# Patient Record
Sex: MALE | Race: WHITE | NOT HISPANIC OR LATINO | Employment: FULL TIME | URBAN - METROPOLITAN AREA
[De-identification: names, ages, dates, MRNs, and addresses within clinical notes are randomized per-mention and may not be internally consistent; named-entity substitution may affect disease eponyms.]

---

## 2023-09-29 ENCOUNTER — LAB (OUTPATIENT)
Dept: LAB | Facility: LAB | Age: 28
End: 2023-09-29
Payer: COMMERCIAL

## 2023-09-29 LAB — TOBACCO SCREEN, URINE: NEGATIVE

## 2024-04-29 ENCOUNTER — ANESTHESIA EVENT (OUTPATIENT)
Dept: OPERATING ROOM | Facility: HOSPITAL | Age: 29
End: 2024-04-29
Payer: COMMERCIAL

## 2024-04-29 ENCOUNTER — OFFICE VISIT (OUTPATIENT)
Dept: ORTHOPEDIC SURGERY | Facility: CLINIC | Age: 29
End: 2024-04-29
Payer: COMMERCIAL

## 2024-04-29 VITALS — WEIGHT: 180 LBS | BODY MASS INDEX: 24.38 KG/M2 | HEIGHT: 72 IN

## 2024-04-29 DIAGNOSIS — S62.012A CLOSED DISPLACED FRACTURE OF DISTAL POLE OF SCAPHOID BONE OF LEFT WRIST, INITIAL ENCOUNTER: ICD-10-CM

## 2024-04-29 DIAGNOSIS — S62.102A WRIST FRACTURE, CLOSED, LEFT, INITIAL ENCOUNTER: Primary | ICD-10-CM

## 2024-04-29 DIAGNOSIS — S63.501A RIGHT WRIST SPRAIN, INITIAL ENCOUNTER: ICD-10-CM

## 2024-04-29 PROCEDURE — 99204 OFFICE O/P NEW MOD 45 MIN: CPT | Performed by: ORTHOPAEDIC SURGERY

## 2024-04-29 PROCEDURE — 1036F TOBACCO NON-USER: CPT | Performed by: ORTHOPAEDIC SURGERY

## 2024-04-29 ASSESSMENT — PAIN - FUNCTIONAL ASSESSMENT: PAIN_FUNCTIONAL_ASSESSMENT: 0-10

## 2024-04-29 ASSESSMENT — PAIN SCALES - GENERAL: PAINLEVEL_OUTOF10: 5 - MODERATE PAIN
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
## 2024-04-30 ENCOUNTER — APPOINTMENT (OUTPATIENT)
Dept: SPORTS MEDICINE | Facility: HOSPITAL | Age: 29
End: 2024-04-30
Payer: COMMERCIAL

## 2024-05-01 ENCOUNTER — APPOINTMENT (OUTPATIENT)
Dept: RADIOLOGY | Facility: HOSPITAL | Age: 29
End: 2024-05-01
Payer: COMMERCIAL

## 2024-05-01 ENCOUNTER — HOSPITAL ENCOUNTER (OUTPATIENT)
Facility: HOSPITAL | Age: 29
Setting detail: OUTPATIENT SURGERY
Discharge: HOME | End: 2024-05-01
Attending: ORTHOPAEDIC SURGERY | Admitting: ORTHOPAEDIC SURGERY
Payer: COMMERCIAL

## 2024-05-01 ENCOUNTER — ANESTHESIA (OUTPATIENT)
Dept: OPERATING ROOM | Facility: HOSPITAL | Age: 29
End: 2024-05-01
Payer: COMMERCIAL

## 2024-05-01 ENCOUNTER — PHARMACY VISIT (OUTPATIENT)
Dept: PHARMACY | Facility: CLINIC | Age: 29
End: 2024-05-01
Payer: COMMERCIAL

## 2024-05-01 VITALS
TEMPERATURE: 99.3 F | HEIGHT: 72 IN | SYSTOLIC BLOOD PRESSURE: 136 MMHG | BODY MASS INDEX: 26.01 KG/M2 | OXYGEN SATURATION: 96 % | RESPIRATION RATE: 16 BRPM | HEART RATE: 84 BPM | DIASTOLIC BLOOD PRESSURE: 75 MMHG | WEIGHT: 192.02 LBS

## 2024-05-01 DIAGNOSIS — S62.102A WRIST FRACTURE, CLOSED, LEFT, INITIAL ENCOUNTER: Primary | ICD-10-CM

## 2024-05-01 PROCEDURE — 2780000003 HC OR 278 NO HCPCS: Performed by: ORTHOPAEDIC SURGERY

## 2024-05-01 PROCEDURE — 3700000002 HC GENERAL ANESTHESIA TIME - EACH INCREMENTAL 1 MINUTE: Performed by: ORTHOPAEDIC SURGERY

## 2024-05-01 PROCEDURE — 2500000004 HC RX 250 GENERAL PHARMACY W/ HCPCS (ALT 636 FOR OP/ED): Performed by: ANESTHESIOLOGY

## 2024-05-01 PROCEDURE — 25675 CLTX DSTL RAD/ULN DISLC MNPJ: CPT | Performed by: ORTHOPAEDIC SURGERY

## 2024-05-01 PROCEDURE — 2500000001 HC RX 250 WO HCPCS SELF ADMINISTERED DRUGS (ALT 637 FOR MEDICARE OP): Performed by: ORTHOPAEDIC SURGERY

## 2024-05-01 PROCEDURE — RXMED WILLOW AMBULATORY MEDICATION CHARGE

## 2024-05-01 PROCEDURE — 7100000010 HC PHASE TWO TIME - EACH INCREMENTAL 1 MINUTE: Performed by: ORTHOPAEDIC SURGERY

## 2024-05-01 PROCEDURE — 2500000004 HC RX 250 GENERAL PHARMACY W/ HCPCS (ALT 636 FOR OP/ED): Performed by: NURSE ANESTHETIST, CERTIFIED REGISTERED

## 2024-05-01 PROCEDURE — 25622 CLTX CARPL SCPHD FX W/O MNPJ: CPT | Performed by: ORTHOPAEDIC SURGERY

## 2024-05-01 PROCEDURE — 76000 FLUOROSCOPY <1 HR PHYS/QHP: CPT | Mod: 59

## 2024-05-01 PROCEDURE — A25609 PR OPEN RX DISTAL RADIUS FX, INTRA-ARTICULAR, 3+ FRAG: Performed by: ANESTHESIOLOGY

## 2024-05-01 PROCEDURE — A25609 PR OPEN RX DISTAL RADIUS FX, INTRA-ARTICULAR, 3+ FRAG: Performed by: NURSE ANESTHETIST, CERTIFIED REGISTERED

## 2024-05-01 PROCEDURE — 2720000007 HC OR 272 NO HCPCS: Performed by: ORTHOPAEDIC SURGERY

## 2024-05-01 PROCEDURE — 3600000008 HC OR TIME - EACH INCREMENTAL 1 MINUTE - PROCEDURE LEVEL THREE: Performed by: ORTHOPAEDIC SURGERY

## 2024-05-01 PROCEDURE — 7100000009 HC PHASE TWO TIME - INITIAL BASE CHARGE: Performed by: ORTHOPAEDIC SURGERY

## 2024-05-01 PROCEDURE — 7100000002 HC RECOVERY ROOM TIME - EACH INCREMENTAL 1 MINUTE: Performed by: ORTHOPAEDIC SURGERY

## 2024-05-01 PROCEDURE — 2500000005 HC RX 250 GENERAL PHARMACY W/O HCPCS: Performed by: NURSE ANESTHETIST, CERTIFIED REGISTERED

## 2024-05-01 PROCEDURE — 64450 NJX AA&/STRD OTHER PN/BRANCH: CPT | Performed by: ANESTHESIOLOGY

## 2024-05-01 PROCEDURE — A4217 STERILE WATER/SALINE, 500 ML: HCPCS | Performed by: ORTHOPAEDIC SURGERY

## 2024-05-01 PROCEDURE — 2500000004 HC RX 250 GENERAL PHARMACY W/ HCPCS (ALT 636 FOR OP/ED): Performed by: ORTHOPAEDIC SURGERY

## 2024-05-01 PROCEDURE — C1713 ANCHOR/SCREW BN/BN,TIS/BN: HCPCS | Performed by: ORTHOPAEDIC SURGERY

## 2024-05-01 PROCEDURE — 3700000001 HC GENERAL ANESTHESIA TIME - INITIAL BASE CHARGE: Performed by: ORTHOPAEDIC SURGERY

## 2024-05-01 PROCEDURE — 25609 OPTX DST RD XART FX/EP SEP3+: CPT | Performed by: ORTHOPAEDIC SURGERY

## 2024-05-01 PROCEDURE — 7100000001 HC RECOVERY ROOM TIME - INITIAL BASE CHARGE: Performed by: ORTHOPAEDIC SURGERY

## 2024-05-01 PROCEDURE — 3600000003 HC OR TIME - INITIAL BASE CHARGE - PROCEDURE LEVEL THREE: Performed by: ORTHOPAEDIC SURGERY

## 2024-05-01 DEVICE — PEG, SMOOTH LOCKING, 2.0 X 19MM, TI: Type: IMPLANTABLE DEVICE | Site: WRIST | Status: FUNCTIONAL

## 2024-05-01 DEVICE — IMPLANTABLE DEVICE: Type: IMPLANTABLE DEVICE | Site: WRIST | Status: FUNCTIONAL

## 2024-05-01 DEVICE — PEG, SMOOTH LOCKING, 2.0 X 18MM, TI: Type: IMPLANTABLE DEVICE | Site: WRIST | Status: FUNCTIONAL

## 2024-05-01 DEVICE — K-WIRE, 1.5 X 127MM: Type: IMPLANTABLE DEVICE | Site: WRIST | Status: NON-FUNCTIONAL

## 2024-05-01 RX ORDER — OXYCODONE HYDROCHLORIDE 5 MG/1
5 TABLET ORAL EVERY 6 HOURS PRN
COMMUNITY

## 2024-05-01 RX ORDER — ONDANSETRON HYDROCHLORIDE 2 MG/ML
4 INJECTION, SOLUTION INTRAVENOUS ONCE AS NEEDED
Status: COMPLETED | OUTPATIENT
Start: 2024-05-01 | End: 2024-05-01

## 2024-05-01 RX ORDER — MIDAZOLAM HYDROCHLORIDE 1 MG/ML
INJECTION INTRAMUSCULAR; INTRAVENOUS AS NEEDED
Status: DISCONTINUED | OUTPATIENT
Start: 2024-05-01 | End: 2024-05-01

## 2024-05-01 RX ORDER — CHLORHEXIDINE GLUCONATE 40 MG/ML
SOLUTION TOPICAL AS NEEDED
Status: DISCONTINUED | OUTPATIENT
Start: 2024-05-01 | End: 2024-05-01 | Stop reason: HOSPADM

## 2024-05-01 RX ORDER — LIDOCAINE HCL/PF 100 MG/5ML
SYRINGE (ML) INTRAVENOUS AS NEEDED
Status: DISCONTINUED | OUTPATIENT
Start: 2024-05-01 | End: 2024-05-01

## 2024-05-01 RX ORDER — IBUPROFEN 200 MG
400 TABLET ORAL EVERY 8 HOURS PRN
COMMUNITY

## 2024-05-01 RX ORDER — KETOROLAC TROMETHAMINE 30 MG/ML
INJECTION, SOLUTION INTRAMUSCULAR; INTRAVENOUS AS NEEDED
Status: DISCONTINUED | OUTPATIENT
Start: 2024-05-01 | End: 2024-05-01

## 2024-05-01 RX ORDER — DROPERIDOL 2.5 MG/ML
0.62 INJECTION, SOLUTION INTRAMUSCULAR; INTRAVENOUS ONCE AS NEEDED
Status: DISCONTINUED | OUTPATIENT
Start: 2024-05-01 | End: 2024-05-01 | Stop reason: HOSPADM

## 2024-05-01 RX ORDER — SODIUM CHLORIDE, SODIUM LACTATE, POTASSIUM CHLORIDE, CALCIUM CHLORIDE 600; 310; 30; 20 MG/100ML; MG/100ML; MG/100ML; MG/100ML
100 INJECTION, SOLUTION INTRAVENOUS CONTINUOUS
Status: DISCONTINUED | OUTPATIENT
Start: 2024-05-01 | End: 2024-05-01 | Stop reason: HOSPADM

## 2024-05-01 RX ORDER — PROPOFOL 10 MG/ML
INJECTION, EMULSION INTRAVENOUS AS NEEDED
Status: DISCONTINUED | OUTPATIENT
Start: 2024-05-01 | End: 2024-05-01

## 2024-05-01 RX ORDER — MIDAZOLAM HYDROCHLORIDE 1 MG/ML
INJECTION, SOLUTION INTRAMUSCULAR; INTRAVENOUS AS NEEDED
Status: DISCONTINUED | OUTPATIENT
Start: 2024-05-01 | End: 2024-05-01

## 2024-05-01 RX ORDER — CEFAZOLIN 1 G/1
INJECTION, POWDER, FOR SOLUTION INTRAVENOUS AS NEEDED
Status: DISCONTINUED | OUTPATIENT
Start: 2024-05-01 | End: 2024-05-01

## 2024-05-01 RX ORDER — SODIUM CHLORIDE 0.9 G/100ML
IRRIGANT IRRIGATION AS NEEDED
Status: DISCONTINUED | OUTPATIENT
Start: 2024-05-01 | End: 2024-05-01 | Stop reason: HOSPADM

## 2024-05-01 RX ORDER — OXYCODONE HYDROCHLORIDE 5 MG/1
5 TABLET ORAL EVERY 4 HOURS PRN
Status: DISCONTINUED | OUTPATIENT
Start: 2024-05-01 | End: 2024-05-01 | Stop reason: HOSPADM

## 2024-05-01 RX ORDER — ONDANSETRON HYDROCHLORIDE 2 MG/ML
INJECTION, SOLUTION INTRAVENOUS AS NEEDED
Status: DISCONTINUED | OUTPATIENT
Start: 2024-05-01 | End: 2024-05-01

## 2024-05-01 RX ORDER — ACETAMINOPHEN 500 MG
500 TABLET ORAL EVERY 8 HOURS PRN
COMMUNITY

## 2024-05-01 RX ORDER — OXYCODONE HYDROCHLORIDE 5 MG/1
5 TABLET ORAL EVERY 6 HOURS PRN
Qty: 18 TABLET | Refills: 0 | Status: SHIPPED | OUTPATIENT
Start: 2024-05-01

## 2024-05-01 RX ORDER — FENTANYL CITRATE 50 UG/ML
INJECTION, SOLUTION INTRAMUSCULAR; INTRAVENOUS AS NEEDED
Status: DISCONTINUED | OUTPATIENT
Start: 2024-05-01 | End: 2024-05-01

## 2024-05-01 RX ADMIN — SODIUM CHLORIDE, POTASSIUM CHLORIDE, SODIUM LACTATE AND CALCIUM CHLORIDE 100 ML/HR: 600; 310; 30; 20 INJECTION, SOLUTION INTRAVENOUS at 07:30

## 2024-05-01 RX ADMIN — PROPOFOL 50 MG: 10 INJECTION, EMULSION INTRAVENOUS at 09:50

## 2024-05-01 RX ADMIN — FENTANYL CITRATE 50 MCG: 50 INJECTION, SOLUTION INTRAMUSCULAR; INTRAVENOUS at 09:49

## 2024-05-01 RX ADMIN — LIDOCAINE HYDROCHLORIDE 80 MG: 20 INJECTION INTRAVENOUS at 09:50

## 2024-05-01 RX ADMIN — MIDAZOLAM HYDROCHLORIDE 2 MG: 1 INJECTION, SOLUTION INTRAMUSCULAR; INTRAVENOUS at 09:36

## 2024-05-01 RX ADMIN — MIDAZOLAM 2 MG: 1 INJECTION INTRAMUSCULAR; INTRAVENOUS at 08:05

## 2024-05-01 RX ADMIN — PROPOFOL 200 MG: 10 INJECTION, EMULSION INTRAVENOUS at 09:49

## 2024-05-01 RX ADMIN — ONDANSETRON 4 MG: 2 INJECTION INTRAMUSCULAR; INTRAVENOUS at 09:49

## 2024-05-01 RX ADMIN — KETOROLAC TROMETHAMINE 30 MG: 30 INJECTION, SOLUTION INTRAMUSCULAR at 11:11

## 2024-05-01 RX ADMIN — DEXAMETHASONE SODIUM PHOSPHATE 8 MG: 4 INJECTION INTRA-ARTICULAR; INTRALESIONAL; INTRAMUSCULAR; INTRAVENOUS; SOFT TISSUE at 09:51

## 2024-05-01 RX ADMIN — CEFAZOLIN 2 G: 330 INJECTION, POWDER, FOR SOLUTION INTRAMUSCULAR; INTRAVENOUS at 09:52

## 2024-05-01 RX ADMIN — ONDANSETRON 4 MG: 2 INJECTION INTRAMUSCULAR; INTRAVENOUS at 12:34

## 2024-05-01 RX ADMIN — ONDANSETRON 4 MG: 2 INJECTION INTRAMUSCULAR; INTRAVENOUS at 11:10

## 2024-05-01 SDOH — HEALTH STABILITY: MENTAL HEALTH: CURRENT SMOKER: 0

## 2024-05-01 ASSESSMENT — PAIN - FUNCTIONAL ASSESSMENT: PAIN_FUNCTIONAL_ASSESSMENT: UNABLE TO SELF-REPORT

## 2024-05-01 NOTE — ANESTHESIA PREPROCEDURE EVALUATION
"Patient: Nacho Pendleton    Procedure Information       Date/Time: 05/01/24 0900    Procedure: LEFT WRIST ORIF (Left: Wrist) - with Regional Block    Location: [facility] / [facility]    Surgeons: Alexander Guy MD          Vitals:    05/01/24 0701   BP: (!) 147/99   Pulse: 83   Resp: 16   Temp: 36.9 °C (98.4 °F)   SpO2: 100%       History reviewed. No pertinent surgical history.  Past Medical History:   Diagnosis Date    Other specified health status 06/01/2021    No pertinent past medical history    Wrist fracture, closed, left, initial encounter        Current Facility-Administered Medications:     lactated Ringer's infusion, 100 mL/hr, intravenous, Continuous, Sam Gonzalez MD, Last Rate: 100 mL/hr at 05/01/24 0730, 100 mL/hr at 05/01/24 0730  Prior to Admission medications    Medication Sig Start Date End Date Taking? Authorizing Provider   acetaminophen (Tylenol) 500 mg tablet Take 1 tablet (500 mg) by mouth every 8 hours if needed for mild pain (1 - 3).   Yes Historical Provider, MD   ibuprofen 200 mg tablet Take 2 tablets (400 mg) by mouth every 8 hours if needed for mild pain (1 - 3).   Yes Historical Provider, MD   oxyCODONE (Roxicodone) 5 mg immediate release tablet Take 1 tablet (5 mg) by mouth every 6 hours if needed for severe pain (7 - 10).   Yes Historical Provider, MD   ciprofloxacin (Ciloxan) 0.3 % ophthalmic solution INSTILL 1 DROP IN LEFT EYE EVERY 4 HOURS.  Patient not taking: Reported on 5/1/2024 9/12/23 9/11/24  Stefano Paula MD     Allergies   Allergen Reactions    Nickel Rash     Social History     Tobacco Use    Smoking status: Never    Smokeless tobacco: Never   Substance Use Topics    Alcohol use: Not Currently         Chemistry    No results found for: \"NA\", \"K\", \"CL\", \"CO2\", \"BUN\", \"CREATININE\", \"GLU\" No results found for: \"CALCIUM\", \"ALKPHOS\", \"AST\", \"ALT\", \"BILITOT\"       No results found for: \"WBC\", \"HGB\", \"HCT\", \"PLT\"  No results found for: \"PROTIME\", \"PTT\", \"INR\"  No " results found for this or any previous visit (from the past 4464 hour(s)).  No results found for this or any previous visit from the past 1095 days.    Relevant Problems   No relevant active problems       Clinical information reviewed:   Tobacco  Allergies  Meds   Med Hx  Surg Hx   Fam Hx  Soc Hx        NPO Detail:  NPO/Void Status  Date of Last Liquid: 04/30/24  Time of Last Liquid: 2100  Date of Last Solid: 04/30/24  Time of Last Solid: 1800  Last Intake Type: Clear fluids; Solid meal  Time of Last Void: 0530         Physical Exam    Airway  Mallampati: II     Cardiovascular - normal exam     Dental    Pulmonary    Abdominal            Anesthesia Plan    History of general anesthesia?: yes  History of complications of general anesthesia?: no    ASA 1     general and regional     The patient is not a current smoker.  Patient was not previously instructed to abstain from smoking on day of procedure.  Patient did not smoke on day of procedure.  Education provided regarding risk of obstructive sleep apnea.  intravenous induction   Anesthetic plan and risks discussed with patient.    Plan discussed with CRNA.

## 2024-05-01 NOTE — ANESTHESIA PROCEDURE NOTES
Peripheral Block    Patient location during procedure: pre-op  Reason for block: at surgeon's request and post-op pain management  Staffing  Performed: attending   Authorized by: Sam Gonzalez MD    Performed by: Sam Gonzalez MD  Preanesthetic Checklist  Completed: patient identified, IV checked, site marked, risks and benefits discussed, surgical consent, monitors and equipment checked, pre-op evaluation and timeout performed   Timeout performed at:   Peripheral Block  Patient position: laying flat  Prep: ChloraPrep and site prepped and draped  Patient monitoring: heart rate and continuous pulse ox  Block type: infraclavicular and adductor canal  Laterality: left  Injection technique: single-shot  Guidance: ultrasound guided  Needle  Needle type: short-bevel   Needle gauge: 22 G  Needle length: 5 cm  Needle localization: anatomical landmarks, ultrasound guidance and nerve stimulator  Assessment  Injection assessment: negative aspiration for heme, no paresthesia on injection, incremental injection and local visualized surrounding nerve on ultrasound  Paresthesia pain: none  Heart rate change: no  Slow fractionated injection: yes  Additional Notes  30mL 0.5% marcaine 1:300k epi 4mg decadron
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
## 2024-05-01 NOTE — ANESTHESIA POSTPROCEDURE EVALUATION
Patient: Nacho Pendleton    Procedure Summary       Date: 05/01/24 Room / Location: A OR 02 / [facility]    Anesthesia Start: 0938 Anesthesia Stop: 1150    Procedure: LEFT WRIST ORIF (Left: Wrist) Diagnosis:       Wrist fracture, closed, left, initial encounter      (Wrist fracture, closed, left, initial encounter [S62.102A])    Surgeons: Alexander Guy MD Responsible Provider: Sam Gonzalez MD    Anesthesia Type: general, regional ASA Status: 1            Anesthesia Type: general, regional    Vitals Value Taken Time   /78 05/01/24 1250   Temp  05/01/24 1300   Pulse 81 05/01/24 1255   Resp 17 05/01/24 1205   SpO2 94 % 05/01/24 1255   Vitals shown include unfiled device data.    Anesthesia Post Evaluation    Patient location during evaluation: PACU  Patient participation: complete - patient participated  Level of consciousness: awake  Pain management: adequate  Multimodal analgesia pain management approach  Airway patency: patent  Two or more strategies used to mitigate risk of obstructive sleep apnea  Cardiovascular status: acceptable  Respiratory status: acceptable  Hydration status: acceptable  Postoperative Nausea and Vomiting: none        No notable events documented.

## 2024-05-01 NOTE — DISCHARGE INSTRUCTIONS
Dr. Guy Post-Operative Instructions  Hand/Wrist/Elbow    Activity:  Rest on the day of surgery.  Gradually resume your regular diet, beginning with clear liquids and progressing as you feel ready.  No driving if you had anesthesia.    Anesthesia:  You may feel dizzy, sleepy or lightheaded for up to 24 hours after surgery.  If you had general anesthesia you may have a sore throat for 1-2 days.  If you had a nerve block wear a sling until nerve function returns for your safety.  Nerve block may last 18-36 hours.    Post-Operative Medications:  You may resume your regular medications (including blood thinners if you stopped them).  You have been given a prescription for pain medication as needed.  You may also take over-the-counter anti-inflammatories and/or Tylenol for pain relief.  If you are taking the prescribed pain medication you must limit additional Tylenol (acetaminophen) intake to avoid overdose.    Dressings:  Keep your splint clean and dry.  You may cover with a plastic bag or cast cover and seal bag to your skin above the bandage for showering.  If your dressing becomes wet or significantly bloodstained call the office at [phone].    Post-Operative Care:  Keep the surgical site elevated above the level of your heart to limit swelling.  If your fingers are not included in the dressing you are encouraged to move your fingers regularly (full fist and full extension).  Regularly ice the surgical site.  You may also apply ice pack above the level of the dressing and this will cool the blood as it travels towards the surgical site.    Call Surgeon/Office at any time for:           Office Number: [phone]  Excessive bleeding  Loss of feeling (The local numbing medicine from surgery typically lasts 8-12 hours.  Nerve blocks can last 18-36 hours.)  Tight dressing: Make sure that you are elevating the operative site appropriately.  If no relief then call the office.                                                                                                         Circulation issues:  If fingers change to white or blue  Concerns/Problems with your surgery

## 2024-05-01 NOTE — ANESTHESIA PROCEDURE NOTES
Airway  Date/Time: 5/1/2024 9:51 AM  Urgency: elective    Airway not difficult    Staffing  Performed: CRNA   Authorized by: Sam Gonzalez MD    Performed by: REGINA Du-CRNA  Patient location during procedure: OR    Indications and Patient Condition  Indications for airway management: airway protection and anesthesia  Spontaneous Ventilation: absent  Sedation level: deep  Preoxygenated: yes  Mask difficulty assessment: 1 - vent by mask    Final Airway Details  Final airway type: supraglottic airway      Successful airway: Size 4 (i-Gel)     Number of attempts at approach: 1    Additional Comments  Easy LMA placement.  Teeth and lips in preanesthetic condition.

## 2024-05-13 ENCOUNTER — OFFICE VISIT (OUTPATIENT)
Dept: ORTHOPEDIC SURGERY | Facility: CLINIC | Age: 29
End: 2024-05-13
Payer: COMMERCIAL

## 2024-05-13 ENCOUNTER — HOSPITAL ENCOUNTER (OUTPATIENT)
Dept: RADIOLOGY | Facility: HOSPITAL | Age: 29
Discharge: HOME | End: 2024-05-13
Payer: COMMERCIAL

## 2024-05-13 DIAGNOSIS — S62.012A CLOSED DISPLACED FRACTURE OF DISTAL POLE OF SCAPHOID BONE OF LEFT WRIST, INITIAL ENCOUNTER: ICD-10-CM

## 2024-05-13 PROCEDURE — 99024 POSTOP FOLLOW-UP VISIT: CPT | Performed by: ORTHOPAEDIC SURGERY

## 2024-05-13 PROCEDURE — 73110 X-RAY EXAM OF WRIST: CPT | Mod: LT

## 2024-05-13 PROCEDURE — 73110 X-RAY EXAM OF WRIST: CPT | Mod: LEFT SIDE | Performed by: RADIOLOGY

## 2024-05-13 PROCEDURE — 1036F TOBACCO NON-USER: CPT | Performed by: ORTHOPAEDIC SURGERY

## 2024-05-13 ASSESSMENT — PAIN - FUNCTIONAL ASSESSMENT: PAIN_FUNCTIONAL_ASSESSMENT: NO/DENIES PAIN

## 2024-05-14 NOTE — PROGRESS NOTES
History of Present Illness  Chief Complaint   Patient presents with    Left Wrist - Post-op     5/1/24 left wrist ORIF      Pain controlled.  No numbness or tingling  New radiographs in splint today     Examination  left wrist  Splint is clean, dry and intact.  Forearm sitting in full supination.  EPL and FPL intact.  Sensation grossly intact to light touch throughout fingers.  Some resolving ecchymosis.  Capillary refill less than 2 seconds to all digits.    Radiographs: Left wrist radiographs ordered and available for review demonstrate maintained alignment of distal radius fracture status post ORIF.  Hardware intact.  Maintained positioning of ulnar styloid fragment.     Assessment:  Patient status post left distal radius ORIF with nonoperative treatment of DRUJ instability and long-arm splint with full supination  Current splint overwrapped into long-arm cast remaining in full supination.  He will follow-up in 2 weeks for cast removal and we will obtain new left wrist radiographs at that time.  Plan for likely transitioning to removable brace and begin gradual mobilization at that time.  We once again reviewed potential plan for hardware removal in the future.  He does have scheduled time off in August and this may work well for timing of hardware removal.

## 2024-05-28 ENCOUNTER — OFFICE VISIT (OUTPATIENT)
Dept: ORTHOPEDIC SURGERY | Facility: CLINIC | Age: 29
End: 2024-05-28
Payer: COMMERCIAL

## 2024-05-28 ENCOUNTER — HOSPITAL ENCOUNTER (OUTPATIENT)
Dept: RADIOLOGY | Facility: CLINIC | Age: 29
Discharge: HOME | End: 2024-05-28
Payer: COMMERCIAL

## 2024-05-28 DIAGNOSIS — S62.012A CLOSED DISPLACED FRACTURE OF DISTAL POLE OF SCAPHOID BONE OF LEFT WRIST, INITIAL ENCOUNTER: ICD-10-CM

## 2024-05-28 DIAGNOSIS — S62.102A WRIST FRACTURE, CLOSED, LEFT, INITIAL ENCOUNTER: Primary | ICD-10-CM

## 2024-05-28 PROCEDURE — L3908 WHO COCK-UP NONMOLDE PRE OTS: HCPCS | Performed by: ORTHOPAEDIC SURGERY

## 2024-05-28 PROCEDURE — 73110 X-RAY EXAM OF WRIST: CPT | Mod: LEFT SIDE | Performed by: RADIOLOGY

## 2024-05-28 PROCEDURE — 99024 POSTOP FOLLOW-UP VISIT: CPT | Performed by: ORTHOPAEDIC SURGERY

## 2024-05-28 PROCEDURE — 73110 X-RAY EXAM OF WRIST: CPT | Mod: LT

## 2024-05-28 PROCEDURE — 1036F TOBACCO NON-USER: CPT | Performed by: ORTHOPAEDIC SURGERY

## 2024-05-28 ASSESSMENT — PAIN - FUNCTIONAL ASSESSMENT: PAIN_FUNCTIONAL_ASSESSMENT: NO/DENIES PAIN

## 2024-05-29 NOTE — PROGRESS NOTES
History of Present Illness  Chief Complaint   Patient presents with    Left Wrist - Post-op     5/1/24 left wrist ORIF     No numbness or tingling.  Pain has been relatively well-controlled.  Just obtained new radiographs out of cast.     Examination  left wrist  EPL and FPL intact.  0 cm DPC  Sensation intact throughout distally.  Wrist range of motion not assessed  Capillary refill less than 2 seconds to all digits.    Radiographs: Left wrist radiographs ordered and available for review demonstrate maintained alignment of distal radius fracture status post ORIF.  Hardware intact.  Distal pole of scaphoid fracture also appears to be well-healing and remains well aligned.       Assessment:  Patient status post left distal radius ORIF with nonoperative treatment of DRUJ instability with long-arm immobilization in supination.  Nonoperative treatment distal pole of scaphoid fracture.    Patient transition to removable wrist brace.  We discussed remaining nonweightbearing and referral to therapy has also been made to begin active range of motion exercises.  He will follow-up with me in 3 weeks with repeat left wrist radiographs at that time.

## 2024-05-30 ENCOUNTER — EVALUATION (OUTPATIENT)
Dept: OCCUPATIONAL THERAPY | Facility: HOSPITAL | Age: 29
End: 2024-05-30
Payer: COMMERCIAL

## 2024-05-30 DIAGNOSIS — S62.012A CLOSED DISPLACED FRACTURE OF DISTAL POLE OF SCAPHOID BONE OF LEFT WRIST, INITIAL ENCOUNTER: ICD-10-CM

## 2024-05-30 DIAGNOSIS — S62.102A WRIST FRACTURE, CLOSED, LEFT, INITIAL ENCOUNTER: Primary | ICD-10-CM

## 2024-05-30 PROCEDURE — 97140 MANUAL THERAPY 1/> REGIONS: CPT | Mod: GO

## 2024-05-30 PROCEDURE — 97165 OT EVAL LOW COMPLEX 30 MIN: CPT | Mod: GO

## 2024-05-30 ASSESSMENT — ENCOUNTER SYMPTOMS
DEPRESSION: 0
LOSS OF SENSATION IN FEET: 0
OCCASIONAL FEELINGS OF UNSTEADINESS: 0
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
## 2024-06-04 ENCOUNTER — TREATMENT (OUTPATIENT)
Dept: OCCUPATIONAL THERAPY | Facility: HOSPITAL | Age: 29
End: 2024-06-04
Payer: COMMERCIAL

## 2024-06-04 DIAGNOSIS — S62.102A WRIST FRACTURE, CLOSED, LEFT, INITIAL ENCOUNTER: ICD-10-CM

## 2024-06-04 PROCEDURE — 97110 THERAPEUTIC EXERCISES: CPT | Mod: GO

## 2024-06-04 PROCEDURE — 97530 THERAPEUTIC ACTIVITIES: CPT | Mod: GO

## 2024-06-04 NOTE — PROGRESS NOTES
Occupational Therapy Treatment    Patient Name: Nacho Pendleton  MRN: [id number]  Today's Date: 6/4/2024  Time Calculation  Start Time: 1140  Stop Time: 1220  Time Calculation (min): 40 min      Insurance  Visit 2 of 30 (OT and PT)  Authorization: not required  Insurance plan:  employee health plan    Assessment: Nacho is making progress with L wrist AROM     Plan: therapeutic exercise, therapeutic activity, self-care, home management, manual therapy, neuromuscular coordination, vasopneumatic, electric stimulation, fluidotherapy, ultrasound, kinesiotaping, orthosis fabrication, wound/scar/edema care  Goals  In 8-10 weeks, Nacho will achieve the following goals  He will be able to perform self-care, household, work, and leisure tasks with lower pain (1-3/10), 75% of the time.     He will improve left wrist range of motion in order to fully perform self-care, household, work and leisure tasks.  Wrist Extension/Flexion 60/50 degrees, Ulnar/Radial Deviation 20/30 deg, Supination/Pronation 70/80 deg    He will regain 4+/5 to 5/5 left wrist/forearm strength in order to fully perform self-care, household, work, and leisure tasks:     He will decrease his QuickDASH score by 20-30 points (70.45 at eval)     Patient's goals for therapy: get back to surgery in 3 weeks    Plan of care was developed with input and agreement by the patient  Frequency: 1-2 x Week  Duration: 8 Weeks    Subjective (DOS 5/1/24, 5 wks p/o)  Able to hold water bottles, do light stuff, has not had to keyboard, not sure when he'll be ready fto go back to operating. Elbow has full motion, just a little soreness with full extension    Precautions told to Nacho by Dr. Guy on 5/28: no passive sup/pron for another couple weeks, no weight bearing. Cast removed on 5/28, transitioned into prefab [facility]. Nacho has not used his L hand at all.    Pain  Aches briefly after HEP, especially with pronation, or hand use    Objective   Outcome Measure: QuickDASH:  70.45    Wound/scar: adherent    Edema: mild/mod about L hand/wrist    Sensation  WNL    AROM LUE  Shoulder/Elbow/Hand: WNL    Wrist: (degrees)  flexion 17   extension 28   pronation 38  supination 65   radial deviation 15   ulnar deviation 25     Strength TBE   L Wrist:  flexion /5  extension /5  radial deviation /5  ulnar deviation /5  pronation /5  supination /5    L Hand:   strength (pos 2):     L , R   pinch strength: 3 point: L , R                             prater:      L , R      Treatment  Education: home exercise program, plan of care, activity modification, pain management, and pertinent anatomy     Modalities: Moist heat to L forearm/wrist/hand with 1 lb wrist flexion stretch to prepare for treatment  Therapeutic Exercise: Nacho performed his home program: active wrist flex/ext/rd/ud/sup/pron. He stretched his wrist in flexion/extension by rolling small plastic ball forward and backward on the table with palm and fingers on the ball the whole time x 20, He used the wrist rotator for supination/pronation x 20. UBE for gripping and general UE strengthening, level 1 x 3 min in each direction. I gave him the blue foam sponge for  strengthening and silicone gel scar pad to be worn at night to soften scar.  Therapeutic Activities: Dart throwing with affected UE as full arm dynamic functional task requiring DTM of the wrist      HEP: scar massage, use of moist heat followed by active wrist flex/ext/rd/ud/sup/pron,  strengthening x 2 sets of 10 no more than 5 times a day.       OT Therapeutic Procedures Time Entry  Therapeutic Activity Time Entry: 8  Therapeutic Exercise Time Entry: 22  OT Modalities Time Entry  Hot/Cold Pack Time Entry: 10(not charged)

## 2024-06-13 ENCOUNTER — TREATMENT (OUTPATIENT)
Dept: OCCUPATIONAL THERAPY | Facility: HOSPITAL | Age: 29
End: 2024-06-13
Payer: COMMERCIAL

## 2024-06-13 DIAGNOSIS — S62.102A WRIST FRACTURE, CLOSED, LEFT, INITIAL ENCOUNTER: ICD-10-CM

## 2024-06-13 PROCEDURE — 97110 THERAPEUTIC EXERCISES: CPT | Mod: GO

## 2024-06-13 PROCEDURE — 97530 THERAPEUTIC ACTIVITIES: CPT | Mod: GO

## 2024-06-13 NOTE — PROGRESS NOTES
Occupational Therapy Treatment    Patient Name: Nacho Pendleton  MRN: [id number]  Today's Date: 6/13/2024  Time Calculation  Start Time: 0915  Stop Time: 1000  Time Calculation (min): 45 min      Insurance  Visit 3 of 30 (OT and PT)  Authorization: not required  Insurance plan:  employee health plan    Assessment: Nacho is making progress with L wrist AROM and functional hand use     Plan: therapeutic exercise, therapeutic activity, self-care, home management, manual therapy, neuromuscular coordination, vasopneumatic, electric stimulation, fluidotherapy, ultrasound, kinesiotaping, orthosis fabrication, scar/edema care  Goals  In 8-10 weeks, Nacho will achieve the following goals  He will be able to perform self-care, household, work, and leisure tasks with lower pain (1-3/10), 75% of the time.     He will improve left wrist range of motion in order to fully perform self-care, household, work and leisure tasks. Wrist Extension/Flexion 60/50 degrees, Ulnar/Radial Deviation 20/30 deg, Supination/Pronation 70/80 deg    He will regain 4+/5 to 5/5 left wrist/forearm strength in order to fully perform self-care, household, work, and leisure tasks:     He will decrease his QuickDASH score by 20-30 points (70.45 at eval)     Patient's goals for therapy: get back to surgery in 3 weeks    Plan of care was developed with input and agreement by the patient  Frequency: 1-2 x Week  Duration: 8 Weeks    Subjective (DOS 5/1/24, 6 wks p/o)  Better every day. Nothing that he's not doing other than lifting significant weight. Just minimal soreness with full elbow extension. Cat ruined silicone gel scar pad - bought replacement which is irritating his skin. Also needs tubigrip.    We can now do passive supination/pronation. Precautions told to Nacho by Dr. Guy on 5/28: 9no passive sup/pron for another couple weeks), no weight bearing.   Cast removed on 5/28, transitioned into prefab WHO.     Pain  Aches briefly after HEP, especially with  pronation, or hand use    Objective   Outcome Measure: QuickDASH: 70.45    Wound/scar: adherent    Edema: mild about L hand/wrist    Sensation: WNL    AROM LUE  Shoulder/Elbow/Hand: WNL    Wrist: (degrees)  flexion 25   extension 42   pronation 50  Supination 76   radial deviation 15   ulnar deviation 25     Strength TBE   L Wrist:  flexion /5  extension /5  radial deviation /5  ulnar deviation /5  pronation /5  supination /5    L Hand:   strength (pos 2):     L 35, R 84 lbs  Key pinch: R 17.8,  L  11.5  3 pt:          R 17.8,  L    5.8    Treatment  Education: home exercise program, plan of care, activity modification, pain management, and pertinent anatomy     Modalities: Moist heat to L forearm/wrist/hand with 2 lb wrist flexion stretch to prepare for treatment  Therapeutic Exercise: Assessed /pinch strength, reassessed AROM, scar, symptoms, functional status, HEP and compliance. Instructed Nacho in active-assisted wrist flex/ext/rd/ud/sup/pron. He stretched his wrist in flexion/extension by rolling small plastic ball forward and backward on the table with palm and fingers on the ball the whole time x 20, He used the wrist rotator for supination/pronation x 20. (He bought one for himself.) I advanced him to the putty he has at home for /pinch strengthening. Issued replacement silicone gel scar pad to be worn at night to soften scar and tubigrip F.  Therapeutic Activities: Soft frisbee throwing/catching with me requiring elbow extension and flexion, wrist flexion and ulnar deviation to extension and radial deviation, and key pinch x 30 throws.    HEP: scar massage and use of silicone gel pad, use of tubigrip F, use of moist heat followed by active-assisted wrist flex/ext/rd/ud/sup/pron, /pinch strengthening; all x 2 sets of 10 no more than 5 times a day.       OT Therapeutic Procedures Time Entry  Therapeutic Activity Time Entry: 10  Therapeutic Exercise Time Entry: 25  OT Modalities Time  Entry  Hot/Cold Pack Time Entry: 10(not charged)

## 2024-06-18 ENCOUNTER — APPOINTMENT (OUTPATIENT)
Dept: ORTHOPEDIC SURGERY | Facility: CLINIC | Age: 29
End: 2024-06-18
Payer: COMMERCIAL

## 2024-06-18 ENCOUNTER — HOSPITAL ENCOUNTER (OUTPATIENT)
Dept: RADIOLOGY | Facility: CLINIC | Age: 29
Discharge: HOME | End: 2024-06-18
Payer: COMMERCIAL

## 2024-06-18 DIAGNOSIS — S62.102A WRIST FRACTURE, CLOSED, LEFT, INITIAL ENCOUNTER: ICD-10-CM

## 2024-06-18 PROCEDURE — 73090 X-RAY EXAM OF FOREARM: CPT | Mod: LT

## 2024-06-18 PROCEDURE — 73110 X-RAY EXAM OF WRIST: CPT | Mod: LEFT SIDE | Performed by: RADIOLOGY

## 2024-06-18 PROCEDURE — 1036F TOBACCO NON-USER: CPT | Performed by: ORTHOPAEDIC SURGERY

## 2024-06-18 PROCEDURE — 73090 X-RAY EXAM OF FOREARM: CPT | Mod: LEFT SIDE | Performed by: RADIOLOGY

## 2024-06-18 PROCEDURE — 99024 POSTOP FOLLOW-UP VISIT: CPT | Performed by: ORTHOPAEDIC SURGERY

## 2024-06-18 PROCEDURE — 73110 X-RAY EXAM OF WRIST: CPT | Mod: LT

## 2024-06-18 ASSESSMENT — PAIN - FUNCTIONAL ASSESSMENT: PAIN_FUNCTIONAL_ASSESSMENT: NO/DENIES PAIN

## 2024-06-18 NOTE — PROGRESS NOTES
History of Present Illness  Chief Complaint   Patient presents with    Left Wrist - Post-op     5/1/24 left wrist ORIF     Wearing a removable wrist brace.  He has been doing home exercise program regularly and also attending in person hand therapy sessions.  Primary area of stiffness is with wrist pronation.  Also with stiffness in wrist flexion/extension.     Examination  left wrist  EPL and FPL intact.  0 cm DPC  Sensation intact throughout distally.  40 degrees wrist flexion and 45 degrees extension.  80 degrees supination and 20 degrees pronation.  Capillary refill less than 2 seconds to all digits.  No tenderness about the distal radius dorsal metaphyseal region.  No tenderness about distal pole of scaphoid.    Radiographs: Left wrist radiographs ordered and available for review demonstrate continued healing of distal radius fracture with maintained alignment.  Hardware intact.  Distal pole of scaphoid also appears to be healing.       Assessment:  Patient status post left distal radius ORIF with nonoperative treatment of DRUJ instability with long-arm immobilization in supination.  Nonoperative treatment distal pole of scaphoid fracture.    Okay to begin gradually transitioning out of removable brace and begin increasing weightbearing slowly.  We discussed expected recovery course, particularly given the extent of soft tissue injury and his initial DRUJ instability.  Follow-up in 1 month for clinical check.  Plan on discussing possible volar plate removal at follow-up.

## 2024-06-20 ENCOUNTER — TREATMENT (OUTPATIENT)
Dept: OCCUPATIONAL THERAPY | Facility: HOSPITAL | Age: 29
End: 2024-06-20
Payer: COMMERCIAL

## 2024-06-20 DIAGNOSIS — S62.102A WRIST FRACTURE, CLOSED, LEFT, INITIAL ENCOUNTER: ICD-10-CM

## 2024-06-20 PROCEDURE — 97140 MANUAL THERAPY 1/> REGIONS: CPT | Mod: GO

## 2024-06-20 PROCEDURE — 97530 THERAPEUTIC ACTIVITIES: CPT | Mod: GO

## 2024-06-20 PROCEDURE — 97022 WHIRLPOOL THERAPY: CPT | Mod: GO

## 2024-06-20 NOTE — PROGRESS NOTES
Occupational Therapy Treatment    Patient Name: Nacho Pnedleton  MRN: [id number]  Today's Date: 6/20/2024  Time Calculation  Start Time: 0835  Stop Time: 0940  Time Calculation (min): 65 min      Insurance  Visit 4 of 30 (OT and PT)  Authorization: not required  Insurance plan:  employee health plan    Assessment: Nacho continues to make progress with L wrist AROM and functional hand use     Plan: strengthening  Goals  In 8-10 weeks, Nacho will achieve the following goals  He will be able to perform self-care, household, work, and leisure tasks with lower pain (1-3/10), 75% of the time.     He will improve left wrist range of motion in order to fully perform self-care, household, work and leisure tasks. Wrist Extension/Flexion 60/50 degrees, Ulnar/Radial Deviation 20/30 deg, Supination/Pronation 70/80 deg    He will regain 4+/5 to 5/5 left wrist/forearm strength in order to fully perform self-care, household, work, and leisure tasks:     He will decrease his QuickDASH score by 20-30 points (70.45 at eval)     Patient's goals for therapy: get back to surgery in 3 weeks    Plan of care was developed with input and agreement by the patient  Frequency: 1-2 x Week  Duration: 8 Weeks    Subjective (DOS 5/1/24, 7 wks p/o)  Better every day. Nothing that he's not doing other than lifting significant weight. Just minimal soreness with full elbow extension. Cat ruined silicone gel scar pad - bought replacement which is irritating his skin. Also needs tubigrip.    We can now do passive supination/pronation. Precautions told to Nacho by Dr. Guy on 5/28: no passive sup/pron for another couple weeks, no weight bearing.   Cast removed on 5/28, transitioned into prefab [facility].     Pain  Pronation is the only thing that bothers him    Objective   Outcome Measure: QuickDASH: 70.45    Wound/scar: adherent    Edema: mild about L hand/wrist    Sensation: WNL    AROM LUE  Shoulder/Elbow/Hand: WNL    Wrist: (degrees)  flexion 38   extension 53    pronation 46  Supination 83   radial deviation 20   ulnar deviation 25     Strength TBE   L Wrist:  flexion /5  extension /5  radial deviation /5  ulnar deviation /5  pronation /5  supination /5    L Hand:   strength (pos 2):     L 35, R 84 lbs  Key pinch: R 17.8,  L  11.5  3 pt:          R 17.8,  L    5.8    Treatment  Education: home exercise program, plan of care, activity modification, pain management, and pertinent anatomy     Modalities: Fluidotherapy and A/AROM of B wrists/hands to prepare for treatment  Manual: Reassessed AROM, scar, symptoms, functional status, HEP and compliance. Manual scar massage, wrist distraction to stretch soft tissues, radial head glides while Nacho actively supinated/pronated his forearm, forearm joint mobilization just distal to the elbow for increased pronation. Instructed him in using a hammer to stretch in pronation/supination, added to HEP  Therapeutic Activities: Manipulation of beige putty with hands, rolling pin, and Puttycize tools for hand strengthening while simulating functional tasks    HEP: scar massage and use of silicone gel pad, use of tubigrip F, use of moist heat followed by active-assisted wrist flex/ext/rd/ud/sup/pron, /pinch strengthening, ammer to stretch in pronation/supination; all x 2 sets of 10 no more than 5 times a day.       OT Therapeutic Procedures Time Entry  Manual Therapy Time Entry: 15  Therapeutic Activity Time Entry: 38  OT Modalities Time Entry  [employer] Time Entry: 12

## 2024-07-16 ENCOUNTER — APPOINTMENT (OUTPATIENT)
Dept: ORTHOPEDIC SURGERY | Facility: CLINIC | Age: 29
End: 2024-07-16
Payer: COMMERCIAL

## 2024-07-16 DIAGNOSIS — S62.102A WRIST FRACTURE, LEFT, CLOSED, INITIAL ENCOUNTER: Primary | ICD-10-CM

## 2024-07-16 PROCEDURE — 1036F TOBACCO NON-USER: CPT | Performed by: ORTHOPAEDIC SURGERY

## 2024-07-16 PROCEDURE — 99024 POSTOP FOLLOW-UP VISIT: CPT | Performed by: ORTHOPAEDIC SURGERY

## 2024-07-16 ASSESSMENT — PAIN - FUNCTIONAL ASSESSMENT: PAIN_FUNCTIONAL_ASSESSMENT: NO/DENIES PAIN

## 2024-07-16 NOTE — PROGRESS NOTES
History of Present Illness  Chief Complaint   Patient presents with    Left Wrist - Post-op     5/1/24 left wrist ORIF     Mobility and function continue to progress.  He is still limited with pronation.  This does improve while he is doing exercises.     Examination  left wrist  EPL and FPL intact.  0 cm DPC  Sensation intact throughout distally.  50/50 degrees wrist flexion/extension  80 degrees supination and 65 degrees pronation.  Capillary refill less than 2 seconds to all digits.  No tenderness about the distal radius dorsal metaphyseal region.  No tenderness about distal pole of scaphoid.     Assessment:  Patient status post left distal radius ORIF with nonoperative treatment of DRUJ instability with long-arm immobilization in supination.  Nonoperative treatment distal pole of scaphoid fracture.  -Continue to progress with activities as tolerated.  -Home exercises for working on pronation should also be continued  -We discussed risks and benefits of hardware retention versus hardware removal, particularly given the need for distal placement of the plate secondary to the distal nature of the fracture.  After thoroughly reviewing patient wishes to proceed with surgical intervention.    The surgical benefits and the potential complications were reviewed with the patient today, as well as the day surgical setting and the likely postoperative course.  Patient understands that the goal of surgery is prevention of worsening symptoms and potential relief of some symptoms.  Risks discussed included, but were not limited to, residual/recurrent/worsening symptoms, pain, infection, bleeding, stiffness, injury to nerve/blood vessels/tendons, wound healing issues and possible need for reoperation.  I answered the patient's questions and surgical consent reviewed and obtained.      The patient will followup with us in the operating room and then postoperatively.

## 2024-08-05 ENCOUNTER — ANESTHESIA EVENT (OUTPATIENT)
Dept: OPERATING ROOM | Facility: CLINIC | Age: 29
End: 2024-08-05
Payer: COMMERCIAL

## 2024-08-07 ENCOUNTER — ANESTHESIA (OUTPATIENT)
Dept: OPERATING ROOM | Facility: CLINIC | Age: 29
End: 2024-08-07
Payer: COMMERCIAL

## 2024-08-07 ENCOUNTER — PHARMACY VISIT (OUTPATIENT)
Dept: PHARMACY | Facility: CLINIC | Age: 29
End: 2024-08-07
Payer: COMMERCIAL

## 2024-08-07 ENCOUNTER — HOSPITAL ENCOUNTER (OUTPATIENT)
Facility: CLINIC | Age: 29
Setting detail: OUTPATIENT SURGERY
Discharge: HOME | End: 2024-08-07
Attending: ORTHOPAEDIC SURGERY | Admitting: ORTHOPAEDIC SURGERY
Payer: COMMERCIAL

## 2024-08-07 ENCOUNTER — HOSPITAL ENCOUNTER (OUTPATIENT)
Dept: RADIOLOGY | Facility: CLINIC | Age: 29
Setting detail: OUTPATIENT SURGERY
Discharge: HOME | End: 2024-08-07
Payer: COMMERCIAL

## 2024-08-07 VITALS
HEART RATE: 95 BPM | WEIGHT: 184.97 LBS | BODY MASS INDEX: 25.05 KG/M2 | RESPIRATION RATE: 18 BRPM | HEIGHT: 72 IN | OXYGEN SATURATION: 98 % | DIASTOLIC BLOOD PRESSURE: 70 MMHG | SYSTOLIC BLOOD PRESSURE: 110 MMHG | TEMPERATURE: 98.4 F

## 2024-08-07 DIAGNOSIS — S62.102A WRIST FRACTURE, LEFT, CLOSED, INITIAL ENCOUNTER: Primary | ICD-10-CM

## 2024-08-07 PROCEDURE — 3600000008 HC OR TIME - EACH INCREMENTAL 1 MINUTE - PROCEDURE LEVEL THREE: Performed by: ORTHOPAEDIC SURGERY

## 2024-08-07 PROCEDURE — 3700000001 HC GENERAL ANESTHESIA TIME - INITIAL BASE CHARGE: Performed by: ORTHOPAEDIC SURGERY

## 2024-08-07 PROCEDURE — 3600000003 HC OR TIME - INITIAL BASE CHARGE - PROCEDURE LEVEL THREE: Performed by: ORTHOPAEDIC SURGERY

## 2024-08-07 PROCEDURE — 7100000001 HC RECOVERY ROOM TIME - INITIAL BASE CHARGE: Performed by: ORTHOPAEDIC SURGERY

## 2024-08-07 PROCEDURE — 2500000005 HC RX 250 GENERAL PHARMACY W/O HCPCS: Performed by: ANESTHESIOLOGIST ASSISTANT

## 2024-08-07 PROCEDURE — 2500000004 HC RX 250 GENERAL PHARMACY W/ HCPCS (ALT 636 FOR OP/ED): Performed by: ANESTHESIOLOGIST ASSISTANT

## 2024-08-07 PROCEDURE — 3700000002 HC GENERAL ANESTHESIA TIME - EACH INCREMENTAL 1 MINUTE: Performed by: ORTHOPAEDIC SURGERY

## 2024-08-07 PROCEDURE — RXMED WILLOW AMBULATORY MEDICATION CHARGE

## 2024-08-07 PROCEDURE — 20680 REMOVAL OF IMPLANT DEEP: CPT | Performed by: ORTHOPAEDIC SURGERY

## 2024-08-07 PROCEDURE — 2500000005 HC RX 250 GENERAL PHARMACY W/O HCPCS: Performed by: ORTHOPAEDIC SURGERY

## 2024-08-07 PROCEDURE — A20680 PR REMOVAL DEEP IMPLANT: Performed by: ANESTHESIOLOGIST ASSISTANT

## 2024-08-07 PROCEDURE — 7100000010 HC PHASE TWO TIME - EACH INCREMENTAL 1 MINUTE: Performed by: ORTHOPAEDIC SURGERY

## 2024-08-07 PROCEDURE — 7100000009 HC PHASE TWO TIME - INITIAL BASE CHARGE: Performed by: ORTHOPAEDIC SURGERY

## 2024-08-07 PROCEDURE — A20680 PR REMOVAL DEEP IMPLANT: Performed by: ANESTHESIOLOGY

## 2024-08-07 PROCEDURE — 7100000002 HC RECOVERY ROOM TIME - EACH INCREMENTAL 1 MINUTE: Performed by: ORTHOPAEDIC SURGERY

## 2024-08-07 RX ORDER — PROPOFOL 10 MG/ML
INJECTION, EMULSION INTRAVENOUS AS NEEDED
Status: DISCONTINUED | OUTPATIENT
Start: 2024-08-07 | End: 2024-08-07

## 2024-08-07 RX ORDER — HYDROCODONE BITARTRATE AND ACETAMINOPHEN 5; 325 MG/1; MG/1
1 TABLET ORAL EVERY 6 HOURS PRN
Qty: 12 TABLET | Refills: 0 | Status: SHIPPED | OUTPATIENT
Start: 2024-08-07 | End: 2024-08-12

## 2024-08-07 RX ORDER — OXYCODONE HYDROCHLORIDE 5 MG/1
10 TABLET ORAL EVERY 4 HOURS PRN
Status: DISCONTINUED | OUTPATIENT
Start: 2024-08-07 | End: 2024-08-07 | Stop reason: HOSPADM

## 2024-08-07 RX ORDER — SODIUM CHLORIDE, SODIUM LACTATE, POTASSIUM CHLORIDE, CALCIUM CHLORIDE 600; 310; 30; 20 MG/100ML; MG/100ML; MG/100ML; MG/100ML
100 INJECTION, SOLUTION INTRAVENOUS CONTINUOUS
Status: DISCONTINUED | OUTPATIENT
Start: 2024-08-07 | End: 2024-08-07 | Stop reason: HOSPADM

## 2024-08-07 RX ORDER — GLYCOPYRROLATE 0.2 MG/ML
INJECTION INTRAMUSCULAR; INTRAVENOUS AS NEEDED
Status: DISCONTINUED | OUTPATIENT
Start: 2024-08-07 | End: 2024-08-07

## 2024-08-07 RX ORDER — HYDROMORPHONE HYDROCHLORIDE 1 MG/ML
INJECTION, SOLUTION INTRAMUSCULAR; INTRAVENOUS; SUBCUTANEOUS AS NEEDED
Status: DISCONTINUED | OUTPATIENT
Start: 2024-08-07 | End: 2024-08-07

## 2024-08-07 RX ORDER — CEFAZOLIN 1 G/1
INJECTION, POWDER, FOR SOLUTION INTRAVENOUS AS NEEDED
Status: DISCONTINUED | OUTPATIENT
Start: 2024-08-07 | End: 2024-08-07

## 2024-08-07 RX ORDER — ONDANSETRON HYDROCHLORIDE 2 MG/ML
INJECTION, SOLUTION INTRAVENOUS AS NEEDED
Status: DISCONTINUED | OUTPATIENT
Start: 2024-08-07 | End: 2024-08-07

## 2024-08-07 RX ORDER — FENTANYL CITRATE 50 UG/ML
INJECTION, SOLUTION INTRAMUSCULAR; INTRAVENOUS AS NEEDED
Status: DISCONTINUED | OUTPATIENT
Start: 2024-08-07 | End: 2024-08-07

## 2024-08-07 RX ORDER — FENTANYL CITRATE 50 UG/ML
25 INJECTION, SOLUTION INTRAMUSCULAR; INTRAVENOUS EVERY 5 MIN PRN
Status: DISCONTINUED | OUTPATIENT
Start: 2024-08-07 | End: 2024-08-07 | Stop reason: HOSPADM

## 2024-08-07 RX ORDER — ACETAMINOPHEN 325 MG/1
650 TABLET ORAL EVERY 4 HOURS PRN
Status: DISCONTINUED | OUTPATIENT
Start: 2024-08-07 | End: 2024-08-07 | Stop reason: HOSPADM

## 2024-08-07 RX ORDER — MIDAZOLAM HYDROCHLORIDE 1 MG/ML
INJECTION, SOLUTION INTRAMUSCULAR; INTRAVENOUS AS NEEDED
Status: DISCONTINUED | OUTPATIENT
Start: 2024-08-07 | End: 2024-08-07

## 2024-08-07 RX ORDER — DIPHENHYDRAMINE HYDROCHLORIDE 50 MG/ML
12.5 INJECTION INTRAMUSCULAR; INTRAVENOUS ONCE AS NEEDED
Status: DISCONTINUED | OUTPATIENT
Start: 2024-08-07 | End: 2024-08-07 | Stop reason: HOSPADM

## 2024-08-07 RX ORDER — SODIUM CHLORIDE 0.9 G/100ML
IRRIGANT IRRIGATION AS NEEDED
Status: DISCONTINUED | OUTPATIENT
Start: 2024-08-07 | End: 2024-08-07 | Stop reason: HOSPADM

## 2024-08-07 RX ORDER — FENTANYL CITRATE 50 UG/ML
50 INJECTION, SOLUTION INTRAMUSCULAR; INTRAVENOUS EVERY 5 MIN PRN
Status: DISCONTINUED | OUTPATIENT
Start: 2024-08-07 | End: 2024-08-07 | Stop reason: HOSPADM

## 2024-08-07 RX ORDER — LIDOCAINE HYDROCHLORIDE 20 MG/ML
INJECTION, SOLUTION INFILTRATION; PERINEURAL AS NEEDED
Status: DISCONTINUED | OUTPATIENT
Start: 2024-08-07 | End: 2024-08-07

## 2024-08-07 RX ORDER — BUPIVACAINE HYDROCHLORIDE AND EPINEPHRINE 5; 5 MG/ML; UG/ML
INJECTION, SOLUTION EPIDURAL; INTRACAUDAL; PERINEURAL AS NEEDED
Status: DISCONTINUED | OUTPATIENT
Start: 2024-08-07 | End: 2024-08-07 | Stop reason: HOSPADM

## 2024-08-07 RX ORDER — ALBUTEROL SULFATE 0.83 MG/ML
2.5 SOLUTION RESPIRATORY (INHALATION) ONCE AS NEEDED
Status: DISCONTINUED | OUTPATIENT
Start: 2024-08-07 | End: 2024-08-07 | Stop reason: HOSPADM

## 2024-08-07 RX ORDER — PHENYLEPHRINE HCL IN 0.9% NACL 0.4MG/10ML
SYRINGE (ML) INTRAVENOUS AS NEEDED
Status: DISCONTINUED | OUTPATIENT
Start: 2024-08-07 | End: 2024-08-07

## 2024-08-07 RX ORDER — ONDANSETRON HYDROCHLORIDE 2 MG/ML
4 INJECTION, SOLUTION INTRAVENOUS ONCE AS NEEDED
Status: DISCONTINUED | OUTPATIENT
Start: 2024-08-07 | End: 2024-08-07 | Stop reason: HOSPADM

## 2024-08-07 RX ORDER — OXYCODONE HYDROCHLORIDE 5 MG/1
5 TABLET ORAL EVERY 4 HOURS PRN
Status: DISCONTINUED | OUTPATIENT
Start: 2024-08-07 | End: 2024-08-07 | Stop reason: HOSPADM

## 2024-08-07 SDOH — HEALTH STABILITY: MENTAL HEALTH: CURRENT SMOKER: 0

## 2024-08-07 ASSESSMENT — PAIN - FUNCTIONAL ASSESSMENT
PAIN_FUNCTIONAL_ASSESSMENT: 0-10

## 2024-08-07 ASSESSMENT — PAIN SCALES - GENERAL
PAINLEVEL_OUTOF10: 0 - NO PAIN

## 2024-08-07 NOTE — OP NOTE
Pre-Operative Diagnosis: Left wrist fracture  Post-Operative Diagnosis: same  Procedure: Left wrist hardware removal  Surgeon: Brooks  Assistant: Fabienne  Anesthesia: General  Complications: none  Estimated blood loss: Minimal  Specimen: None  Implants:  Nothing was implanted during the procedure  Findings: See below  Disposition: Good/PACU      Indications: Patient underwent operative fixation of left intra-articular distal radius fracture and due to the positioning of the plate and initial fracture we discussed eventual hardware removal in order to hopefully prevent flexor tendon irritation.  Expected operative and postoperative courses reviewed and questions addressed.  Plan to proceed, as indicated.    Operative course: Patient was greeted in the preoperative holding area and the operative site was marked with indelible marker.  Patient was brought back to the operating room suite where general anesthetic was induced by the anesthesia team.  Left upper extremities prepped and draped in standard sterile fashion timeout procedure was performed as per standard protocol.  Esmarch was used to exsanguinate left upper extremity and upper arm tourniquet was inflated.  The previous scar was reincised and gentle spreading was carried down through the subcutaneous tissues.  The superficial FCR sheath was incised and the FCR as well as secondary muscle belly were retracted ulnarly.  The floor of the FCR sheath was incised and the scarred and pronator quadratus was elevated off of the plate.  Screws were then able to be removed sequentially and plate removed uneventfully.  The prominent tissues were debrided with rongeur and wound was irrigated.  Fluoroscopic imaging confirmed complete hardware removal.  There was no evidence of flexor tendon irritation at this time.  Tourniquet was released and small venous bleeders were cauterized with bipolar electrocautery.  Prior to skin closure note was made of a small localized  rash on the skin in the region of the volar wrist.  There was no rash elsewhere.  With 4-0 Monocryl in a buried interrupted fashion followed by 4-0 -loc subcuticular stitch.  Exofin mesh dressing was applied and patient was awoken from anesthesia uneventfully after dry sterile dressing was applied.    The rash was discussed with the patient and he does note history of sensitive skin.  He will call the office if any issues or concerns.

## 2024-08-07 NOTE — ANESTHESIA PROCEDURE NOTES
Airway  Date/Time: 8/7/2024 1:30 PM  Urgency: elective    Airway not difficult    Staffing  Performed: CHELITA   Authorized by: CHELITA Simmons    Performed by: CHELITA Simmons  Patient location during procedure: OR    Indications and Patient Condition  Indications for airway management: anesthesia  Spontaneous Ventilation: absent  Sedation level: deep  Preoxygenated: yes  Patient position: sniffing  Mask difficulty assessment: 0 - not attempted    Final Airway Details  Final airway type: supraglottic airway      Successful airway: Size 4      Additional Comments  Easy ambu LMA by CHELITA

## 2024-08-07 NOTE — ANESTHESIA POSTPROCEDURE EVALUATION
Patient: Nacho Pendleton    Procedure Summary       Date: 08/07/24 Room / Location: [facility] [facility] / [facility]    Anesthesia Start: 1321 Anesthesia Stop: 1439    Procedure: LEFT WRIST HARDWARE REMOVAL (Left: Wrist) Diagnosis:       Wrist fracture, left, closed, initial encounter      (Wrist fracture, left, closed, initial encounter [S62.102A])    Surgeons: Alexander Guy MD Responsible Provider: Mohan Hoffman DO    Anesthesia Type: general ASA Status: 1            Anesthesia Type: general    Vitals Value Taken Time   /68 08/07/24 1512   Temp 36.9 08/07/24 1512   Pulse 85 08/07/24 1512   Resp 16 08/07/24 1512   SpO2 98% 08/07/24 1512       Anesthesia Post Evaluation    Patient location during evaluation: PACU  Patient participation: complete - patient participated  Level of consciousness: awake and alert  Pain management: adequate  Airway patency: patent  Cardiovascular status: acceptable and blood pressure returned to baseline  Respiratory status: acceptable  Hydration status: acceptable  Postoperative Nausea and Vomiting: none        No notable events documented.

## 2024-08-07 NOTE — ANESTHESIA PREPROCEDURE EVALUATION
"Patient: Nacho Pendleton    Procedure Information       Date/Time: 08/07/24 1258    Procedure: LEFT WRIST HARDWARE REMOVAL (Left: Wrist) - 60 min    [place] [facility] [place] [facility] [place] [facility] [place] [facility]    Surgeons: Alexander Guy MD            Relevant Problems   Anesthesia (within normal limits)      Cardiac (within normal limits)      Pulmonary (within normal limits)      Neuro (within normal limits)      GI (within normal limits)      /Renal (within normal limits)      Liver (within normal limits)      Endocrine (within normal limits)      Hematology (within normal limits)     Vitals:    08/07/24 1233   BP: 124/64   Pulse: 78   Resp: 18   Temp: 37 °C (98.6 °F)   SpO2: (!) 78%       History reviewed. No pertinent surgical history.  Past Medical History:   Diagnosis Date    Wrist fracture, closed, left, initial encounter 04/26/2024     No current facility-administered medications for this encounter.  Prior to Admission medications    Medication Sig Start Date End Date Taking? Authorizing Provider   acetaminophen (Tylenol) 500 mg tablet Take 1 tablet (500 mg) by mouth every 8 hours if needed for mild pain (1 - 3).    Historical Provider, MD   ibuprofen 200 mg tablet Take 2 tablets (400 mg) by mouth every 8 hours if needed for mild pain (1 - 3).    Historical Provider, MD     Allergies   Allergen Reactions    Nickel Rash     Social History     Tobacco Use    Smoking status: Never    Smokeless tobacco: Never   Substance Use Topics    Alcohol use: Not Currently         Chemistry    No results found for: \"NA\", \"K\", \"CL\", \"CO2\", \"BUN\", \"CREATININE\", \"GLU\" No results found for: \"CALCIUM\", \"ALKPHOS\", \"AST\", \"ALT\", \"BILITOT\"         Clinical information reviewed:   Tobacco  Allergies  Meds   Med Hx  Surg Hx   Fam Hx  Soc Hx        NPO Detail:  NPO/Void Status  Date of Last Liquid: 08/06/24  Time of Last Liquid: 1800  Date of Last Solid: 08/06/24  Time of Last Solid: 1800  Last Intake Type: Light meal     "     Physical Exam    Airway  Mallampati: I  TM distance: >3 FB  Neck ROM: full     Cardiovascular   Rhythm: regular  Rate: normal     Dental   Comments: Denies   Pulmonary   Breath sounds clear to auscultation     Abdominal            Anesthesia Plan    History of general anesthesia?: yes  History of complications of general anesthesia?: no    ASA 1     general     The patient is not a current smoker.    intravenous induction   Postoperative administration of opioids is intended.  Trial extubation is planned.  Anesthetic plan and risks discussed with patient.

## 2024-08-07 NOTE — DISCHARGE INSTRUCTIONS
Dr. Guy Post-Operative Instructions  Hand/Wrist/Elbow    Activity:  Rest on the day of surgery.  Gradually resume your regular diet, beginning with clear liquids and progressing as you feel ready.  No driving if you had anesthesia.    Anesthesia:  You may feel dizzy, sleepy or lightheaded for up to 24 hours after surgery.  If you had general anesthesia you may have a sore throat for 1-2 days.  If you had a nerve block wear a sling until nerve function returns for your safety.  Nerve block may last 18-36 hours.    Post-Operative Medications:  You may resume your regular medications (including blood thinners if you stopped them).  You have been given a prescription for pain medication as needed.  You may also take over-the-counter anti-inflammatories and/or Tylenol for pain relief.  If you are taking the prescribed pain medication you must limit additional Tylenol (acetaminophen) intake to avoid overdose.    Dressings:  Keep your dressings clean and dry.  You may cover with a plastic bag or cast cover and seal bag to your skin above the bandage for showering.  If your dressing becomes wet or significantly bloodstained call the office at [phone].  Okay to remove outer dressings after 2-3 days and shower/wash hands.  Do not soak wound (I.e. no swimming pool, hot tub or dishes).    Post-Operative Care:  Keep the surgical site elevated above the level of your heart to limit swelling.  If your fingers are not included in the dressing you are encouraged to move your fingers regularly (full fist and full extension).  Regularly ice the surgical site.  You may also apply ice pack above the level of the dressing and this will cool the blood as it travels towards the surgical site.    Call Surgeon/Office at any time for:           Office Number: [phone]  Excessive bleeding  Loss of feeling (The local numbing medicine from surgery typically lasts 8-12 hours.  Nerve blocks can last 18-36 hours.)  Tight  dressing: Make sure that you are elevating the operative site appropriately.  If no relief then call the office.                                                                                                        Circulation issues:  If fingers change to white or blue  Concerns/Problems with your surgery

## 2024-08-20 ENCOUNTER — APPOINTMENT (OUTPATIENT)
Dept: ORTHOPEDIC SURGERY | Facility: CLINIC | Age: 29
End: 2024-08-20
Payer: COMMERCIAL

## 2024-08-27 ENCOUNTER — APPOINTMENT (OUTPATIENT)
Dept: ORTHOPEDIC SURGERY | Facility: CLINIC | Age: 29
End: 2024-08-27
Payer: COMMERCIAL

## 2024-09-05 ENCOUNTER — APPOINTMENT (OUTPATIENT)
Dept: ORTHOPEDIC SURGERY | Facility: CLINIC | Age: 29
End: 2024-09-05
Payer: COMMERCIAL

## 2024-09-05 DIAGNOSIS — S62.102A WRIST FRACTURE, CLOSED, LEFT, INITIAL ENCOUNTER: Primary | ICD-10-CM

## 2024-09-05 PROCEDURE — 1036F TOBACCO NON-USER: CPT | Performed by: ORTHOPAEDIC SURGERY

## 2024-09-05 PROCEDURE — 99211 OFF/OP EST MAY X REQ PHY/QHP: CPT | Performed by: ORTHOPAEDIC SURGERY

## 2024-09-05 ASSESSMENT — ENCOUNTER SYMPTOMS
LOSS OF SENSATION IN FEET: 0
OCCASIONAL FEELINGS OF UNSTEADINESS: 0
DEPRESSION: 0

## 2024-09-05 ASSESSMENT — PATIENT HEALTH QUESTIONNAIRE - PHQ9
SUM OF ALL RESPONSES TO PHQ9 QUESTIONS 1 AND 2: 0
2. FEELING DOWN, DEPRESSED OR HOPELESS: NOT AT ALL
1. LITTLE INTEREST OR PLEASURE IN DOING THINGS: NOT AT ALL

## 2024-09-05 ASSESSMENT — PAIN - FUNCTIONAL ASSESSMENT: PAIN_FUNCTIONAL_ASSESSMENT: NO/DENIES PAIN

## 2024-09-06 NOTE — PROGRESS NOTES
History of Present Illness  Chief Complaint   Patient presents with    Left Wrist - Post-op     8/7/24 left wrist hardware removal     Overall doing very well following left wrist hardware removal.  He has already returned to his regular activities.  He did have some pain/discomfort for a few days following the procedure, but rapidly regained his motion.     Examination  left wrist  Incision is well healing.  No signs of infection.  Smooth wrist arc of motion with no crepitus.   0 cm DPC  EPL and FPL intact.  60/60 degrees flexion/extension.  80/70 degrees supination/pronation.  Sensation grossly intact to light touch throughout hand.  Capillary refill less than 2 seconds to all digits.       Assessment:  Patient status post left distal radius ORIF and subsequent hardware removal  Recommend avoiding contact activities for at least another few weeks.  Otherwise he may continue to progress with activities as tolerated.  We discussed expected recovery course and potential for posttraumatic arthritis down the road.  If he does have any issues or concerns he will call the office for follow-up.

## 2024-12-17 ENCOUNTER — DOCUMENTATION (OUTPATIENT)
Dept: OCCUPATIONAL THERAPY | Facility: HOSPITAL | Age: 29
End: 2024-12-17
Payer: COMMERCIAL

## 2024-12-17 NOTE — PROGRESS NOTES
Discharge Summary    Name: Nacho Pendleton  MRN: [id number]  : 1995  Date: 24    Discharge Summary: OT    Discharge Information: Date of last visit 620, Date of evaluation 530, Number of attended visits 4, Referred by Brooks, and Referred for L scaphoid fx post-op    Therapy Summary: wound/scar/edema management, pain, weakness, paresthesia, splinting, HEP, functional hand use      Discharge Status: unknown     Rehab Discharge Reason: Failed to schedule and/or keep follow-up appointment(s)

## (undated) DEVICE — PADDING, WEBRIL, UNDERCAST, STERILE, 3 IN

## (undated) DEVICE — SOLUTION, IRRIGATION, SODIUM CHLORIDE 0.9%, 1000 ML, POUR BOTTLE

## (undated) DEVICE — COVER, MINI DRAPE SET, C-ARM, FOR OEC/GE

## (undated) DEVICE — SLING, ARM, LARGE

## (undated) DEVICE — BANDAGE, ELASTIC, MATRIX, SELF-CLOSURE, 3 IN X 5 YD, LF

## (undated) DEVICE — HOLSTER, BOVIE, ES PENCIL, DISP

## (undated) DEVICE — Device

## (undated) DEVICE — DRILL, SOLID SIDE CUTTING, 2.0 X 40MM

## (undated) DEVICE — STOCKINETTE, TUBE, BLN, ST, 1 PLY, 4 X 48 IN, LF

## (undated) DEVICE — DRIVER, UNIVERSAL QUICK CONNNECT, T10

## (undated) DEVICE — SUTURE, V-LOC, 4-0, 23IN, P-12, 90 ABS

## (undated) DEVICE — SKIN CLOSURE SYS, PREMIERPRO EXOFIN, 1-4CM X 22CM, 1.75G TUBE

## (undated) DEVICE — SUTURE, VICRYL, 4-0, 27 IN, TF, UNDYED

## (undated) DEVICE — DRILL, SOLID SIDE CUTTING, 2.5 X 40MM

## (undated) DEVICE — SUTURE, VICRYL PLUS 3-0, RB-1, 27IN

## (undated) DEVICE — GUIDES, ALMING, 1.5

## (undated) DEVICE — CUFF, TOURNIQUET, 18 X 4, SNGL PORT/SNGL BLADDER, DISP, LF

## (undated) DEVICE — STRIP, SKIN CLOSURE, STERI STRIP, REINFORCED, 0.5 X 4 IN

## (undated) DEVICE — DRIVER, PEG, TORQUE LIMITING

## (undated) DEVICE — SUTURE, MONOCRYL, 4-0, 18 IN, PS2, UNDYED

## (undated) DEVICE — PREP TRAY, SKIN, DRY, W/GLOVES